# Patient Record
Sex: MALE | Race: WHITE | NOT HISPANIC OR LATINO | URBAN - METROPOLITAN AREA
[De-identification: names, ages, dates, MRNs, and addresses within clinical notes are randomized per-mention and may not be internally consistent; named-entity substitution may affect disease eponyms.]

---

## 2017-06-23 ENCOUNTER — IMPORTED ENCOUNTER (OUTPATIENT)
Dept: URBAN - METROPOLITAN AREA CLINIC 50 | Facility: CLINIC | Age: 72
End: 2017-06-23

## 2017-06-30 ENCOUNTER — IMPORTED ENCOUNTER (OUTPATIENT)
Dept: URBAN - METROPOLITAN AREA CLINIC 50 | Facility: CLINIC | Age: 72
End: 2017-06-30

## 2017-07-03 ENCOUNTER — IMPORTED ENCOUNTER (OUTPATIENT)
Dept: URBAN - METROPOLITAN AREA CLINIC 50 | Facility: CLINIC | Age: 72
End: 2017-07-03

## 2017-07-05 ENCOUNTER — IMPORTED ENCOUNTER (OUTPATIENT)
Dept: URBAN - METROPOLITAN AREA CLINIC 50 | Facility: CLINIC | Age: 72
End: 2017-07-05

## 2018-01-05 ENCOUNTER — IMPORTED ENCOUNTER (OUTPATIENT)
Dept: URBAN - METROPOLITAN AREA CLINIC 50 | Facility: CLINIC | Age: 73
End: 2018-01-05

## 2020-01-08 ENCOUNTER — IMPORTED ENCOUNTER (OUTPATIENT)
Dept: URBAN - METROPOLITAN AREA CLINIC 50 | Facility: CLINIC | Age: 75
End: 2020-01-08

## 2020-01-15 ENCOUNTER — IMPORTED ENCOUNTER (OUTPATIENT)
Dept: URBAN - METROPOLITAN AREA CLINIC 50 | Facility: CLINIC | Age: 75
End: 2020-01-15

## 2020-01-17 ENCOUNTER — IMPORTED ENCOUNTER (OUTPATIENT)
Dept: URBAN - METROPOLITAN AREA CLINIC 50 | Facility: CLINIC | Age: 75
End: 2020-01-17

## 2020-02-05 ENCOUNTER — IMPORTED ENCOUNTER (OUTPATIENT)
Dept: URBAN - METROPOLITAN AREA CLINIC 50 | Facility: CLINIC | Age: 75
End: 2020-02-05

## 2020-02-13 ENCOUNTER — IMPORTED ENCOUNTER (OUTPATIENT)
Dept: URBAN - METROPOLITAN AREA CLINIC 50 | Facility: CLINIC | Age: 75
End: 2020-02-13

## 2020-02-19 ENCOUNTER — IMPORTED ENCOUNTER (OUTPATIENT)
Dept: URBAN - METROPOLITAN AREA CLINIC 50 | Facility: CLINIC | Age: 75
End: 2020-02-19

## 2020-02-19 NOTE — PATIENT DISCUSSION
"""S/P IOL OS: 24. 0(ORA) @ 110Âº (Target: -1.7) +ORA/Femto/Arcs +Omidria. Continue post operative instructions and drops per schedule.  """

## 2020-02-27 ENCOUNTER — IMPORTED ENCOUNTER (OUTPATIENT)
Dept: URBAN - METROPOLITAN AREA CLINIC 50 | Facility: CLINIC | Age: 75
End: 2020-02-27

## 2020-02-27 NOTE — PATIENT DISCUSSION
"""S/P IOL OD: Tecnis [id number] 24.0 @ 75Âº (Target: -1.70) +Femto +Omidria. Continue post operative instructions and drops per schedule.  """

## 2020-02-27 NOTE — PATIENT DISCUSSION
"""*Discussion with patient in pre-op at [facility].  Patient voiced desire for both eyes to be equal ""

## 2020-03-04 ENCOUNTER — IMPORTED ENCOUNTER (OUTPATIENT)
Dept: URBAN - METROPOLITAN AREA CLINIC 50 | Facility: CLINIC | Age: 75
End: 2020-03-04

## 2020-03-04 NOTE — PATIENT DISCUSSION
"""S/P IOL OD: Tecnis [id number] 24.0 @ 75Âº (Target: -1.70) +Femto +Omidria. Continue post operative instructions and drops per schedule.  """

## 2020-07-10 ENCOUNTER — IMPORTED ENCOUNTER (OUTPATIENT)
Dept: URBAN - METROPOLITAN AREA CLINIC 50 | Facility: CLINIC | Age: 75
End: 2020-07-10

## 2021-04-18 ASSESSMENT — VISUAL ACUITY
OS_BAT: 20/80
OS_PH: 20/30
OS_PH: 20/30
OD_SC: 20/100
OD_OTHER: 20/60. 20/70.
OD_PH: 20/25-
OS_CC: 20/40
OD_CC: 20/25
OD_BAT: 20/60
OS_OTHER: 20/80. 20/100.
OS_SC: 20/400
OS_OTHER: 20/80. 20/80.
OS_CC: J1+@ 14 IN
OD_BAT: 20/200
OD_CC: 20/40+
OS_SC: 20/200
OS_CC: 20/40+2
OS_BAT: 20/200
OS_OTHER: 20/80. >20/400.
OS_PH: @ 12 IN
OD_BAT: 20/60
OD_CC: 20/20
OS_OTHER: 20/200.
OD_PH: @ 16 IN
OD_CC: 20/30
OD_BAT: 20/60
OS_BAT: 20/80
OD_SC: 20/100-
OD_OTHER: 20/60. 20/100.
OD_CC: J1+@ 14 IN
OD_OTHER: 20/60. 20/70.
OD_CC: J1
OD_OTHER: 20/200.
OS_CC: J1+
OS_CC: 20/100
OD_BAT: 20/60
OS_PH: 20/40
OS_BAT: 20/80
OD_CC: 20/30
OS_PH: 20/25
OS_CC: 20/25
OD_CC: J1+
OD_PH: 20/25
OS_CC: J1
OS_CC: 20/50
OD_SC: 20/200
OS_CC: 20/400
OD_OTHER: 20/60. 20/70.

## 2021-04-18 ASSESSMENT — TONOMETRY
OD_IOP_MMHG: 15
OD_IOP_MMHG: 15
OD_IOP_MMHG: 16
OD_IOP_MMHG: 18
OS_IOP_MMHG: 14
OD_IOP_MMHG: 18
OS_IOP_MMHG: 14
OS_IOP_MMHG: 20
OD_IOP_MMHG: 20
OS_IOP_MMHG: 18
OD_IOP_MMHG: 18
OS_IOP_MMHG: 18
OS_IOP_MMHG: 20
OS_IOP_MMHG: 11
OS_IOP_MMHG: 13
OD_IOP_MMHG: 18

## 2021-07-07 ENCOUNTER — PREPPED CHART (OUTPATIENT)
Dept: URBAN - METROPOLITAN AREA CLINIC 53 | Facility: CLINIC | Age: 76
End: 2021-07-07

## 2025-06-03 ENCOUNTER — EMERGENCY VISIT (OUTPATIENT)
Age: 80
End: 2025-06-03

## 2025-06-03 DIAGNOSIS — H16.003: ICD-10-CM

## 2025-06-03 PROCEDURE — 99214 OFFICE O/P EST MOD 30 MIN: CPT

## 2025-06-03 RX ORDER — MOXIFLOXACIN OPHTHALMIC 5 MG/ML: 1 SOLUTION/ DROPS OPHTHALMIC

## 2025-06-03 RX ORDER — TOBRAMYCIN 3 MG/ML: 1 SOLUTION/ DROPS OPHTHALMIC

## 2025-06-05 ENCOUNTER — FOLLOW UP (OUTPATIENT)
Age: 80
End: 2025-06-05

## 2025-06-05 DIAGNOSIS — H16.003: ICD-10-CM

## 2025-06-05 PROCEDURE — 99213 OFFICE O/P EST LOW 20 MIN: CPT

## 2025-06-09 ENCOUNTER — FOLLOW UP (OUTPATIENT)
Age: 80
End: 2025-06-09

## 2025-06-09 DIAGNOSIS — H16.003: ICD-10-CM

## 2025-06-09 DIAGNOSIS — H04.123: ICD-10-CM

## 2025-06-09 PROCEDURE — 99212 OFFICE O/P EST SF 10 MIN: CPT

## 2025-06-16 ENCOUNTER — FOLLOW UP (OUTPATIENT)
Age: 80
End: 2025-06-16

## 2025-06-16 DIAGNOSIS — H16.003: ICD-10-CM

## 2025-06-16 PROCEDURE — 99212 OFFICE O/P EST SF 10 MIN: CPT

## 2025-06-23 ENCOUNTER — FOLLOW UP (OUTPATIENT)
Age: 80
End: 2025-06-23

## 2025-06-23 DIAGNOSIS — H16.223: ICD-10-CM

## 2025-06-23 PROCEDURE — 65778PS PLACE AMNIOTIC MEMB/PROKERA SLIM

## 2025-06-23 PROCEDURE — 99213 OFFICE O/P EST LOW 20 MIN: CPT

## 2025-06-26 ENCOUNTER — FOLLOW UP (OUTPATIENT)
Age: 80
End: 2025-06-26

## 2025-06-26 DIAGNOSIS — H16.223: ICD-10-CM

## 2025-06-26 DIAGNOSIS — H16.003: ICD-10-CM

## 2025-06-26 PROCEDURE — 99212 OFFICE O/P EST SF 10 MIN: CPT

## 2025-06-26 RX ORDER — LIFITEGRAST 50 MG/ML: 1 SOLUTION/ DROPS OPHTHALMIC TWICE A DAY

## 2025-06-26 RX ORDER — CARBOXYMETHYLCELLULOSE SODIUM 10 MG/ML: 1 GEL OPHTHALMIC EVERY EVENING

## 2025-06-30 ENCOUNTER — FOLLOW UP (OUTPATIENT)
Age: 80
End: 2025-06-30

## 2025-06-30 DIAGNOSIS — H16.003: ICD-10-CM

## 2025-06-30 DIAGNOSIS — H16.223: ICD-10-CM

## 2025-06-30 PROCEDURE — 99213 OFFICE O/P EST LOW 20 MIN: CPT

## 2025-06-30 RX ORDER — PERFLUOROHEXYLOCTANE 1 MG/MG: 1 SOLUTION OPHTHALMIC
